# Patient Record
Sex: MALE | URBAN - METROPOLITAN AREA
[De-identification: names, ages, dates, MRNs, and addresses within clinical notes are randomized per-mention and may not be internally consistent; named-entity substitution may affect disease eponyms.]

---

## 2024-07-26 PROBLEM — 1269424006: Status: ACTIVE | Noted: 2024-07-26

## 2024-07-29 ENCOUNTER — OFFICE VISIT (OUTPATIENT)
Dept: URBAN - METROPOLITAN AREA CLINIC 72 | Facility: CLINIC | Age: 49
End: 2024-07-29
Payer: COMMERCIAL

## 2024-07-29 ENCOUNTER — DASHBOARD ENCOUNTERS (OUTPATIENT)
Age: 49
End: 2024-07-29

## 2024-07-29 VITALS
DIASTOLIC BLOOD PRESSURE: 92 MMHG | BODY MASS INDEX: 32.63 KG/M2 | WEIGHT: 166.2 LBS | TEMPERATURE: 97.3 F | HEIGHT: 60 IN | SYSTOLIC BLOOD PRESSURE: 128 MMHG | HEART RATE: 50 BPM

## 2024-07-29 DIAGNOSIS — K90.41 GLUTEN INTOLERANCE: ICD-10-CM

## 2024-07-29 DIAGNOSIS — Z12.11 SCREENING FOR MALIGNANT NEOPLASM OF COLON: ICD-10-CM

## 2024-07-29 PROBLEM — 305058001: Status: ACTIVE | Noted: 2024-07-29

## 2024-07-29 PROCEDURE — 99204 OFFICE O/P NEW MOD 45 MIN: CPT | Performed by: INTERNAL MEDICINE

## 2024-07-29 NOTE — HPI-TODAY'S VISIT:
Patient is a 48-year-old male referred by WILBERT Ch to evaluate for gluten intolerance. Patient is having a change  (since he got the COVID vaccine). Patient has itching all over his body. He has gotten small papules over his body - approx 20 that last up to a year. 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   Bowels run on the constipated side and irregular. 100% gluten free for the past 6-8 weeks. Has seen blood on the toilet paper. Has some rectal itching.   6 months ago he had a negative cologuard.   Labs: 1/24/2024 -WBC 5.23, RBC 5.4, Hgb 16, MCV 88.4, , K4.3, BUN 16, creatinine 0.9, total bilirubin 0.7, AST 18, ALT 25, alkaline phosphatase 42, total cholesterol 218, , HDL 59

## 2024-07-29 NOTE — EXAM-FUNCTIONAL ASSESSMENT
General--no acute distress, normal appearance Eyes--anicteric, no pallor HENT--normocephalic, atraumatic head Neck--no lymphadenopathy, symmetric Chest--non labored, equal chest rise Heart--regular rate Abdomen--soft, non tender, non distended, no organomegaly Skin--has skin papules.  Neurologic--Alert and oriented x 3, answers questions appropriately Psychiatric--stable mood, appropriate affect

## 2024-08-05 ENCOUNTER — LAB OUTSIDE AN ENCOUNTER (OUTPATIENT)
Dept: URBAN - METROPOLITAN AREA CLINIC 72 | Facility: CLINIC | Age: 49
End: 2024-08-05

## 2024-10-24 ENCOUNTER — OFFICE VISIT (OUTPATIENT)
Dept: URBAN - METROPOLITAN AREA MEDICAL CENTER 40 | Facility: MEDICAL CENTER | Age: 49
End: 2024-10-24